# Patient Record
Sex: MALE | Race: WHITE | URBAN - METROPOLITAN AREA
[De-identification: names, ages, dates, MRNs, and addresses within clinical notes are randomized per-mention and may not be internally consistent; named-entity substitution may affect disease eponyms.]

---

## 2017-04-29 ENCOUNTER — APPOINTMENT (OUTPATIENT)
Dept: GENERAL RADIOLOGY | Facility: CLINIC | Age: 22
End: 2017-04-29
Attending: FAMILY MEDICINE
Payer: COMMERCIAL

## 2017-04-29 ENCOUNTER — HOSPITAL ENCOUNTER (EMERGENCY)
Facility: CLINIC | Age: 22
Discharge: HOME | End: 2017-04-29
Attending: FAMILY MEDICINE | Admitting: FAMILY MEDICINE
Payer: COMMERCIAL

## 2017-04-29 VITALS
OXYGEN SATURATION: 99 % | TEMPERATURE: 98.5 F | RESPIRATION RATE: 16 BRPM | BODY MASS INDEX: 22 KG/M2 | HEART RATE: 79 BPM | SYSTOLIC BLOOD PRESSURE: 120 MMHG | WEIGHT: 160 LBS | DIASTOLIC BLOOD PRESSURE: 78 MMHG

## 2017-04-29 DIAGNOSIS — S90.32XA CONTUSION OF LEFT FOOT, INITIAL ENCOUNTER: ICD-10-CM

## 2017-04-29 PROCEDURE — 99213 OFFICE O/P EST LOW 20 MIN: CPT | Performed by: FAMILY MEDICINE

## 2017-04-29 PROCEDURE — 73630 X-RAY EXAM OF FOOT: CPT | Mod: LT

## 2017-04-29 PROCEDURE — 99213 OFFICE O/P EST LOW 20 MIN: CPT

## 2017-04-29 NOTE — DISCHARGE INSTRUCTIONS
Discussed with patient and family:    Ice.    Gradually increase activity.    Re check for worsening or persistent pain, swelling.

## 2017-04-29 NOTE — ED PROVIDER NOTES
History     Chief Complaint   Patient presents with     Leg Injury     pitching and ankle left got hit by a line drive.      HPI  Roly Gonzales is a 21 year old male who has a L foot injury.    He was pitching in a college baseball game and was hit by a batted ball. He has pain in medial aspect of L heel. Also has some tenderness in mid L posterior tibial area. Trainer placed him in a cam walker.      I have reviewed the Medications, Allergies, Past Medical and Surgical History, and Social History in the Epic system.    Review of Systems    Unremarkable except as above.      Physical Exam   BP: 120/78  Pulse: 79  Temp: 98.5  F (36.9  C)  Resp: 16  Weight: 72.6 kg (160 lb)  SpO2: 99 %  Physical Exam    L leg:  Some mid posterior tibial tenderness  No swelling or ecchymosis    L foot:  Some purplish discoloration inferior to medial malleolus noted w/o swelling    X ray:  neg      ED Course     ED Course     Procedures             Critical Care time:  none               Labs Ordered and Resulted from Time of ED Arrival Up to the Time of Departure from the ED - No data to display    Assessments & Plan (with Medical Decision Making)     He has foot contusion.ay have had a strain of muscle or tendon - possibly in reaction to the incident. Patient and family advised.      I have reviewed the nursing notes.    I have reviewed the findings, diagnosis, plan and need for follow up with the patient.    Current Discharge Medication List          Final diagnoses:   Contusion of left foot, initial encounter       4/29/2017   [facility] EMERGENCY DEPARTMENT     Trever Suarez MD  04/29/17 [id number]